# Patient Record
Sex: MALE | Race: WHITE | NOT HISPANIC OR LATINO | URBAN - METROPOLITAN AREA
[De-identification: names, ages, dates, MRNs, and addresses within clinical notes are randomized per-mention and may not be internally consistent; named-entity substitution may affect disease eponyms.]

---

## 2018-11-14 ENCOUNTER — APPOINTMENT (RX ONLY)
Dept: URBAN - METROPOLITAN AREA CLINIC 61 | Facility: CLINIC | Age: 73
Setting detail: DERMATOLOGY
End: 2018-11-14

## 2018-11-14 DIAGNOSIS — L57.0 ACTINIC KERATOSIS: ICD-10-CM

## 2018-11-14 PROBLEM — Z85.828 PERSONAL HISTORY OF OTHER MALIGNANT NEOPLASM OF SKIN: Status: ACTIVE | Noted: 2018-11-14

## 2018-11-14 PROBLEM — E78.5 HYPERLIPIDEMIA, UNSPECIFIED: Status: ACTIVE | Noted: 2018-11-14

## 2018-11-14 PROCEDURE — ? PRESCRIPTION

## 2018-11-14 PROCEDURE — 99202 OFFICE O/P NEW SF 15 MIN: CPT

## 2018-11-14 PROCEDURE — ? EDUCATIONAL RESOURCES PROVIDED

## 2018-11-14 PROCEDURE — ? COUNSELING

## 2018-11-14 RX ORDER — FLUOROURACIL 2 G/40G
CREAM TOPICAL QHS
Qty: 1 | Refills: 0 | COMMUNITY
Start: 2018-11-14

## 2018-11-14 RX ADMIN — FLUOROURACIL: 2 CREAM TOPICAL at 00:00

## 2018-12-13 ENCOUNTER — APPOINTMENT (RX ONLY)
Dept: URBAN - METROPOLITAN AREA CLINIC 61 | Facility: CLINIC | Age: 73
Setting detail: DERMATOLOGY
End: 2018-12-13

## 2018-12-13 DIAGNOSIS — L24.4 IRRITANT CONTACT DERMATITIS DUE TO DRUGS IN CONTACT WITH SKIN: ICD-10-CM

## 2018-12-13 PROCEDURE — ? COUNSELING

## 2018-12-13 PROCEDURE — 99212 OFFICE O/P EST SF 10 MIN: CPT

## 2018-12-13 PROCEDURE — ? TREATMENT REGIMEN

## 2018-12-13 ASSESSMENT — LOCATION SIMPLE DESCRIPTION DERM: LOCATION SIMPLE: POSTERIOR SCALP

## 2018-12-13 ASSESSMENT — LOCATION ZONE DERM: LOCATION ZONE: SCALP

## 2018-12-13 ASSESSMENT — LOCATION DETAILED DESCRIPTION DERM: LOCATION DETAILED: POSTERIOR MID-PARIETAL SCALP

## 2018-12-13 NOTE — PROCEDURE: TREATMENT REGIMEN
Plan: pt completed treatment 12/10/2018. Reaction to the cream on scalp is looking great. Informed pt that it will take up to two weeks for the scalp to resolve.
Detail Level: Zone